# Patient Record
Sex: FEMALE | Race: WHITE
[De-identification: names, ages, dates, MRNs, and addresses within clinical notes are randomized per-mention and may not be internally consistent; named-entity substitution may affect disease eponyms.]

---

## 2018-09-11 ENCOUNTER — TRANSCRIPTION ENCOUNTER (OUTPATIENT)
Age: 48
End: 2018-09-11

## 2023-03-17 ENCOUNTER — NON-APPOINTMENT (OUTPATIENT)
Age: 53
End: 2023-03-17

## 2023-03-17 ENCOUNTER — APPOINTMENT (OUTPATIENT)
Dept: OTOLARYNGOLOGY | Facility: CLINIC | Age: 53
End: 2023-03-17
Payer: COMMERCIAL

## 2023-03-17 VITALS
DIASTOLIC BLOOD PRESSURE: 79 MMHG | HEIGHT: 68 IN | TEMPERATURE: 98 F | SYSTOLIC BLOOD PRESSURE: 113 MMHG | HEART RATE: 77 BPM | BODY MASS INDEX: 28.64 KG/M2 | WEIGHT: 189 LBS

## 2023-03-17 DIAGNOSIS — H93.13 TINNITUS, BILATERAL: ICD-10-CM

## 2023-03-17 PROBLEM — Z00.00 ENCOUNTER FOR PREVENTIVE HEALTH EXAMINATION: Status: ACTIVE | Noted: 2023-03-17

## 2023-03-17 PROCEDURE — 99204 OFFICE O/P NEW MOD 45 MIN: CPT

## 2023-03-17 NOTE — ASSESSMENT
[FreeTextEntry1] : 52 year old female presents with bilateral tinnitus for 2 weeks. Exam is normal. Based on his history and exam findings, I am recommending an audiogram and tympanogram to assess for hearing loss. Patient will follow up with us after testing to review results and discuss next steps. \par \par -Tinnitus handout given\par - audio/tymp\par - fu after testing to review results and discuss next steps\par

## 2023-03-17 NOTE — HISTORY OF PRESENT ILLNESS
[de-identified] : 3/17/23\par 52F presents with 2 weeks of bilateral tinnitus. Tinnitus is high pitched ringing, varying in intensity, constant, nonpulsatile. Denies hearing changes, otalgia, otorrhea or vertiginous symptoms. No hx of loud noise exposure. + 1 cup caffeine, 2-3 drinks per week, sleeping well (with white noise), + significant stress w/ work and family. No other ENT issues.

## 2023-03-18 ENCOUNTER — TRANSCRIPTION ENCOUNTER (OUTPATIENT)
Age: 53
End: 2023-03-18

## 2025-04-02 ENCOUNTER — APPOINTMENT (OUTPATIENT)
Dept: ORTHOPEDIC SURGERY | Facility: CLINIC | Age: 55
End: 2025-04-02